# Patient Record
Sex: MALE | Race: OTHER | HISPANIC OR LATINO | URBAN - METROPOLITAN AREA
[De-identification: names, ages, dates, MRNs, and addresses within clinical notes are randomized per-mention and may not be internally consistent; named-entity substitution may affect disease eponyms.]

---

## 2017-08-06 ENCOUNTER — EMERGENCY (EMERGENCY)
Facility: HOSPITAL | Age: 32
LOS: 1 days | Discharge: HOME | End: 2017-08-06
Attending: EMERGENCY MEDICINE | Admitting: EMERGENCY MEDICINE
Payer: SELF-PAY

## 2017-08-06 VITALS
SYSTOLIC BLOOD PRESSURE: 110 MMHG | OXYGEN SATURATION: 99 % | TEMPERATURE: 99 F | DIASTOLIC BLOOD PRESSURE: 66 MMHG | HEART RATE: 61 BPM | WEIGHT: 167.99 LBS | RESPIRATION RATE: 16 BRPM

## 2017-08-06 DIAGNOSIS — Z87.19 PERSONAL HISTORY OF OTHER DISEASES OF THE DIGESTIVE SYSTEM: Chronic | ICD-10-CM

## 2017-08-06 LAB
APPEARANCE UR: CLEAR — SIGNIFICANT CHANGE UP
BACTERIA # UR AUTO: ABNORMAL /HPF
BILIRUB UR-MCNC: NEGATIVE — SIGNIFICANT CHANGE UP
COLOR SPEC: SIGNIFICANT CHANGE UP
DIFF PNL FLD: NEGATIVE — SIGNIFICANT CHANGE UP
GLUCOSE UR QL: NEGATIVE — SIGNIFICANT CHANGE UP
KETONES UR-MCNC: NEGATIVE — SIGNIFICANT CHANGE UP
LEUKOCYTE ESTERASE UR-ACNC: NEGATIVE — SIGNIFICANT CHANGE UP
NITRITE UR-MCNC: NEGATIVE — SIGNIFICANT CHANGE UP
PH UR: 6.5 — SIGNIFICANT CHANGE UP (ref 5–8)
PROT UR-MCNC: NEGATIVE — SIGNIFICANT CHANGE UP
RBC CASTS # UR COMP ASSIST: SIGNIFICANT CHANGE UP /HPF (ref 0–2)
SP GR SPEC: 1.02 — SIGNIFICANT CHANGE UP (ref 1.01–1.02)
UROBILINOGEN FLD QL: NEGATIVE — SIGNIFICANT CHANGE UP
WBC UR QL: SIGNIFICANT CHANGE UP /HPF (ref 0–5)

## 2017-08-06 PROCEDURE — 76870 US EXAM SCROTUM: CPT | Mod: 26

## 2017-08-06 PROCEDURE — 99284 EMERGENCY DEPT VISIT MOD MDM: CPT | Mod: 25

## 2017-08-06 PROCEDURE — 81001 URINALYSIS AUTO W/SCOPE: CPT

## 2017-08-06 PROCEDURE — 76870 US EXAM SCROTUM: CPT

## 2017-08-06 PROCEDURE — 99284 EMERGENCY DEPT VISIT MOD MDM: CPT

## 2017-08-06 RX ORDER — IBUPROFEN 200 MG
600 TABLET ORAL ONCE
Qty: 0 | Refills: 0 | Status: COMPLETED | OUTPATIENT
Start: 2017-08-06 | End: 2017-08-06

## 2017-08-06 RX ADMIN — Medication 600 MILLIGRAM(S): at 13:45

## 2017-08-06 NOTE — ED PROVIDER NOTE - MEDICAL DECISION MAKING DETAILS
pain in perineal region, may be prostatitis vs UTI vs constipation. Will do UA and gc/chlamydia, likely discharge home with stool softeners pain in perineal region, may be prostatitis vs UTI vs constipation. Will do UA and gc/chlamydia, likely discharge home with stool softeners    Attending MD Resendez: 32 male with no PMH and progressive perineal pain over several months.  No painful urination, ejaculation or penile discharge. On exam the testicles are normal, and without TTP, uncircumcised.  Plan: motrin, UA, ultrasound and urology consult.

## 2017-08-06 NOTE — CONSULT NOTE ADULT - SUBJECTIVE AND OBJECTIVE BOX
Consult Note    HPI: 33 y/o M  with many years of perineal pain (described as pain behind the testicles radiating to rectum) that is worsened with movement during the day, occurs in short bursts, appears to be spasms of pain. No obstructive or irritative voiding symptoms, no f/c/n/v, no renal colic, no constipation, no hematuria or dysuria, no hematospermia or pain with ejaculation. Has not tried any meds for this and has not seen anyone for this. Comes in today because these episodes are becoming more frequent.    At the time of evaluation has no pain.     PMH: asthma  PSH: none  FH: no  malignancy  Social: n/c    ROS as above, all else negative    O:  T(F): 98.7 (17 @ 11:02), Max: 98.7 (17 @ 11:02)  HR: 61 (17 @ 11:02) (61 - 61)  BP: 110/66 (17 @ 11:02) (110/66 - 110/66)  RR: 16 (17 @ 11:02) (16 - 16)  SpO2: 99% (17 @ 11:02) (99% - 99%)  Wt(kg): --    PE  NAD  abd benign  no inguinal lymphadenopathy  penis uncircumcised, no phimosis or paraphimosis, meatus normal  testicles benign w/o tenderness  no scrotal cellulitis  no perineal mass, abscess, cellulitis and no trigger point.    Labs:  Urinalysis Basic - ( 06 Aug 2017 12:04 )    Color: x / Appearance: Clear / S.017 / pH: x  Gluc: x / Ketone: Negative  / Bili: Negative / Urobili: Negative   Blood: x / Protein: Negative / Nitrite: Negative   Leuk Esterase: Negative / RBC: 0-2 /HPF / WBC 0-2 /HPF   Sq Epi: x / Non Sq Epi: x / Bacteria: Few /HPF      < from: US Testicles (17 @ 14:36) >  IMPRESSION:     No testicular torsion. No scrotal hematoma.    < end of copied text >

## 2017-08-06 NOTE — ED PROVIDER NOTE - OBJECTIVE STATEMENT
32 M h/o asthma here for testicular pain. Had pain 2 nights ago, then again last night, says between lanight and also last night 32 M h/o asthma here for perineal pain (unlike triage chief complaint, he does NOT have testicular pain). Had pain 2 nights ago, then again last night, states is severe, radiating to the rectum, was not doing anything at time of pain, never happened before. 1 sexual partner is wife, no h/o STI, no penile discharge, no dysuria, no hematuria, no changes in urination. No changes in stooling, no HS, no melena.

## 2017-08-06 NOTE — ED PROVIDER NOTE - PLAN OF CARE
You were seen in the ER for perineal pain. You must follow up with your primary physician in 24 to 48 hours. Return to the ER for any new or worsening signs/symptoms. You must follow up with a urologist in 24 to 48 hours. The number for the urology clinic is [phone].   1) Take ibuprofen, 600 mg,  every 6 to 8 hours for up to 2 weeks for pain control. Afterwards you can take ibuprofen as needed for pain. You were seen in the ER for perineal pain. You must follow up with your primary physician in 24 to 48 hours. Return to the ER for any new or worsening signs/symptoms. You must follow up with a urologist in 24 to 48 hours. The number for the urology clinic is [phone].   1) Take ibuprofen, 600 mg,  every 6 to 8 hours for up to 2 weeks for pain control. Afterwards you can take ibuprofen as needed for pain.  2) Take Tylenol (acetaminophen) 1000 mg every 6 to 8 hours with a daily maximal dose of 3000 mg. Take this regimen for up to 2 weeks, then take the Tylenol only as needed for pain.

## 2017-08-06 NOTE — ED PROVIDER NOTE - NS ED ROS FT
GENERAL: No fever or chills, EYES: no change in vision, HEENT: no trouble swallowing or speaking, CARDIAC: no chest pain, PULMONARY: no cough or SOB, GI: no abdominal pain, no nausea or no vomiting, no diarrhea or constipation, : No changes in urination, SKIN: no rashes, NEURO: no headache,  MSK: No joint pain otherwise as HPI or negative. ~Yaa Carty M.D., Ph.D. -Resident

## 2017-08-06 NOTE — ED ADULT NURSE NOTE - OBJECTIVE STATEMENT
33 yo male A&OX3 presents to the ED with the c/o testicular pain. Pt states that he he has been having intermittent pain from under the testicles to then rectum. Pt states that he has been having symptoms for weeks and has today it lasted 33 yo male A&OX3 presents to the ED with the c/o testicular pain. Pt states that he he has been having intermittent pain from under the testicles to then rectum. Pt states that he has been having symptoms for weeks and has today it lasted 1 min. No c/o urinary incontinence, no burning on urination, no urinary frequency. Pt denies fevers., or chills. Lungs clear, equal b/l no  sob.

## 2017-08-06 NOTE — CONSULT NOTE ADULT - ASSESSMENT
A/P: 32M w/ musculoskeletal-related perineal pain, no acute  pathology    -- tylenol, motrin prn  -- consider skelaxin/flexeril  -- pre-emptive medication when anticipating heavy lifting  -- warm baths, relaxation exercises  -- f/u Dr. Lundy #[phone]

## 2017-08-06 NOTE — ED PROVIDER NOTE - PHYSICAL EXAMINATION
Gen: NAD, AOx3, non-toxic // Head: NCAT // HEENT: EOMI, oral mucosa moist, normal conjunctiva // Lung: CTAB, no respiratory distress, no wheezes/rhonchi/rales B/L, speaking in full sentences. // CV: RRR, no murmurs, rubs or gallops // Abd: soft, NTND, no guarding, no CVA tenderness, rectal exam with smooth prostate, not enlarged, not boggy, nontender. Moderate stool in rectal vault. // MSK: no visible deformities // Neuro: No focal sensory or motor deficits // Skin: Warm, well perfused, no rash // Psych: normal affect. // : Bilateral descended testes, nontender, no inguinal hernia, no color change or swelling. ~Yaa Carty M.D., Ph.D. -Resident

## 2017-08-06 NOTE — ED PROVIDER NOTE - ATTENDING CONTRIBUTION TO CARE
Attending MD Resendez:  I personally have seen and examined this patient.  Resident note reviewed and agree on plan of care and except where noted.  See MDM for details.

## 2017-08-06 NOTE — ED PROVIDER NOTE - CARE PLAN
Principal Discharge DX:	Perineal pain in male  Instructions for follow-up, activity and diet:	You were seen in the ER for perineal pain. You must follow up with your primary physician in 24 to 48 hours. Return to the ER for any new or worsening signs/symptoms. You must follow up with a urologist in 24 to 48 hours. The number for the urology clinic is [phone].   1) Take ibuprofen, 600 mg,  every 6 to 8 hours for up to 2 weeks for pain control. Afterwards you can take ibuprofen as needed for pain. Principal Discharge DX:	Perineal pain in male  Instructions for follow-up, activity and diet:	You were seen in the ER for perineal pain. You must follow up with your primary physician in 24 to 48 hours. Return to the ER for any new or worsening signs/symptoms. You must follow up with a urologist in 24 to 48 hours. The number for the urology clinic is [phone].   1) Take ibuprofen, 600 mg,  every 6 to 8 hours for up to 2 weeks for pain control. Afterwards you can take ibuprofen as needed for pain. Principal Discharge DX:	Perineal pain in male  Instructions for follow-up, activity and diet:	You were seen in the ER for perineal pain. You must follow up with your primary physician in 24 to 48 hours. Return to the ER for any new or worsening signs/symptoms. You must follow up with a urologist in 24 to 48 hours. The number for the urology clinic is [phone].   1) Take ibuprofen, 600 mg,  every 6 to 8 hours for up to 2 weeks for pain control. Afterwards you can take ibuprofen as needed for pain. Principal Discharge DX:	Perineal pain in male  Instructions for follow-up, activity and diet:	You were seen in the ER for perineal pain. You must follow up with your primary physician in 24 to 48 hours. Return to the ER for any new or worsening signs/symptoms. You must follow up with a urologist in 24 to 48 hours. The number for the urology clinic is [phone].   1) Take ibuprofen, 600 mg,  every 6 to 8 hours for up to 2 weeks for pain control. Afterwards you can take ibuprofen as needed for pain.  2) Take Tylenol (acetaminophen) 1000 mg every 6 to 8 hours with a daily maximal dose of 3000 mg. Take this regimen for up to 2 weeks, then take the Tylenol only as needed for pain.

## 2017-08-07 LAB
C TRACH RRNA SPEC QL NAA+PROBE: SIGNIFICANT CHANGE UP
N GONORRHOEA RRNA SPEC QL NAA+PROBE: SIGNIFICANT CHANGE UP
SPECIMEN SOURCE: SIGNIFICANT CHANGE UP

## 2018-05-24 PROBLEM — J45.909 UNSPECIFIED ASTHMA, UNCOMPLICATED: Chronic | Status: ACTIVE | Noted: 2017-08-06

## 2018-06-11 ENCOUNTER — APPOINTMENT (OUTPATIENT)
Dept: INTERNAL MEDICINE | Facility: CLINIC | Age: 33
End: 2018-06-11

## 2018-06-11 PROBLEM — Z00.00 ENCOUNTER FOR PREVENTIVE HEALTH EXAMINATION: Status: ACTIVE | Noted: 2018-06-11

## 2018-06-27 ENCOUNTER — APPOINTMENT (OUTPATIENT)
Dept: INTERNAL MEDICINE | Facility: CLINIC | Age: 33
End: 2018-06-27

## 2018-12-04 ENCOUNTER — EMERGENCY (EMERGENCY)
Facility: HOSPITAL | Age: 33
LOS: 1 days | Discharge: HOME | End: 2018-12-04
Attending: EMERGENCY MEDICINE
Payer: MEDICAID

## 2018-12-04 VITALS
DIASTOLIC BLOOD PRESSURE: 71 MMHG | OXYGEN SATURATION: 99 % | SYSTOLIC BLOOD PRESSURE: 117 MMHG | HEIGHT: 68 IN | RESPIRATION RATE: 18 BRPM | HEART RATE: 68 BPM | WEIGHT: 134.92 LBS

## 2018-12-04 VITALS
RESPIRATION RATE: 16 BRPM | TEMPERATURE: 98 F | DIASTOLIC BLOOD PRESSURE: 75 MMHG | OXYGEN SATURATION: 100 % | HEART RATE: 60 BPM | SYSTOLIC BLOOD PRESSURE: 118 MMHG

## 2018-12-04 DIAGNOSIS — Z87.19 PERSONAL HISTORY OF OTHER DISEASES OF THE DIGESTIVE SYSTEM: Chronic | ICD-10-CM

## 2018-12-04 LAB
ALBUMIN SERPL ELPH-MCNC: 4.7 G/DL — SIGNIFICANT CHANGE UP (ref 3.3–5)
ALP SERPL-CCNC: 105 U/L — SIGNIFICANT CHANGE UP (ref 40–120)
ALT FLD-CCNC: 17 U/L — SIGNIFICANT CHANGE UP (ref 10–45)
AMYLASE P1 CFR SERPL: 107 U/L — SIGNIFICANT CHANGE UP (ref 25–125)
ANION GAP SERPL CALC-SCNC: 14 MMOL/L — SIGNIFICANT CHANGE UP (ref 5–17)
AST SERPL-CCNC: 17 U/L — SIGNIFICANT CHANGE UP (ref 10–40)
BILIRUB SERPL-MCNC: 0.4 MG/DL — SIGNIFICANT CHANGE UP (ref 0.2–1.2)
BUN SERPL-MCNC: 11 MG/DL — SIGNIFICANT CHANGE UP (ref 7–23)
CALCIUM SERPL-MCNC: 9.2 MG/DL — SIGNIFICANT CHANGE UP (ref 8.4–10.5)
CHLORIDE SERPL-SCNC: 102 MMOL/L — SIGNIFICANT CHANGE UP (ref 96–108)
CO2 SERPL-SCNC: 25 MMOL/L — SIGNIFICANT CHANGE UP (ref 22–31)
CREAT SERPL-MCNC: 0.85 MG/DL — SIGNIFICANT CHANGE UP (ref 0.5–1.3)
GLUCOSE SERPL-MCNC: 83 MG/DL — SIGNIFICANT CHANGE UP (ref 70–99)
HCT VFR BLD CALC: 46.3 % — SIGNIFICANT CHANGE UP (ref 39–50)
HGB BLD-MCNC: 16.1 G/DL — SIGNIFICANT CHANGE UP (ref 13–17)
LIDOCAIN IGE QN: 73 U/L — HIGH (ref 7–60)
MCHC RBC-ENTMCNC: 30.1 PG — SIGNIFICANT CHANGE UP (ref 27–34)
MCHC RBC-ENTMCNC: 34.8 GM/DL — SIGNIFICANT CHANGE UP (ref 32–36)
MCV RBC AUTO: 86.5 FL — SIGNIFICANT CHANGE UP (ref 80–100)
OB PNL STL: NEGATIVE — SIGNIFICANT CHANGE UP
PLATELET # BLD AUTO: 259 K/UL — SIGNIFICANT CHANGE UP (ref 150–400)
POTASSIUM SERPL-MCNC: 3.9 MMOL/L — SIGNIFICANT CHANGE UP (ref 3.5–5.3)
POTASSIUM SERPL-SCNC: 3.9 MMOL/L — SIGNIFICANT CHANGE UP (ref 3.5–5.3)
PROT SERPL-MCNC: 7.6 G/DL — SIGNIFICANT CHANGE UP (ref 6–8.3)
RBC # BLD: 5.35 M/UL — SIGNIFICANT CHANGE UP (ref 4.2–5.8)
RBC # FLD: 12.3 % — SIGNIFICANT CHANGE UP (ref 10.3–14.5)
SODIUM SERPL-SCNC: 141 MMOL/L — SIGNIFICANT CHANGE UP (ref 135–145)
WBC # BLD: 5.4 K/UL — SIGNIFICANT CHANGE UP (ref 3.8–10.5)
WBC # FLD AUTO: 5.4 K/UL — SIGNIFICANT CHANGE UP (ref 3.8–10.5)

## 2018-12-04 PROCEDURE — 74177 CT ABD & PELVIS W/CONTRAST: CPT

## 2018-12-04 PROCEDURE — 93005 ELECTROCARDIOGRAM TRACING: CPT

## 2018-12-04 PROCEDURE — 85027 COMPLETE CBC AUTOMATED: CPT

## 2018-12-04 PROCEDURE — 96375 TX/PRO/DX INJ NEW DRUG ADDON: CPT

## 2018-12-04 PROCEDURE — 93010 ELECTROCARDIOGRAM REPORT: CPT

## 2018-12-04 PROCEDURE — 74177 CT ABD & PELVIS W/CONTRAST: CPT | Mod: 26

## 2018-12-04 PROCEDURE — 96374 THER/PROPH/DIAG INJ IV PUSH: CPT | Mod: XU

## 2018-12-04 PROCEDURE — 83690 ASSAY OF LIPASE: CPT

## 2018-12-04 PROCEDURE — 99284 EMERGENCY DEPT VISIT MOD MDM: CPT | Mod: 25

## 2018-12-04 PROCEDURE — 82150 ASSAY OF AMYLASE: CPT

## 2018-12-04 PROCEDURE — 82272 OCCULT BLD FECES 1-3 TESTS: CPT

## 2018-12-04 PROCEDURE — 80053 COMPREHEN METABOLIC PANEL: CPT

## 2018-12-04 RX ORDER — FAMOTIDINE 10 MG/ML
1 INJECTION INTRAVENOUS
Qty: 7 | Refills: 0 | OUTPATIENT
Start: 2018-12-04 | End: 2018-12-10

## 2018-12-04 RX ORDER — FAMOTIDINE 10 MG/ML
20 INJECTION INTRAVENOUS ONCE
Qty: 0 | Refills: 0 | Status: COMPLETED | OUTPATIENT
Start: 2018-12-04 | End: 2018-12-04

## 2018-12-04 RX ORDER — SODIUM CHLORIDE 9 MG/ML
1000 INJECTION INTRAMUSCULAR; INTRAVENOUS; SUBCUTANEOUS ONCE
Qty: 0 | Refills: 0 | Status: COMPLETED | OUTPATIENT
Start: 2018-12-04 | End: 2018-12-04

## 2018-12-04 RX ORDER — ONDANSETRON 8 MG/1
4 TABLET, FILM COATED ORAL ONCE
Qty: 0 | Refills: 0 | Status: COMPLETED | OUTPATIENT
Start: 2018-12-04 | End: 2018-12-04

## 2018-12-04 RX ADMIN — Medication 30 MILLILITER(S): at 20:41

## 2018-12-04 RX ADMIN — ONDANSETRON 4 MILLIGRAM(S): 8 TABLET, FILM COATED ORAL at 20:42

## 2018-12-04 RX ADMIN — SODIUM CHLORIDE 1000 MILLILITER(S): 9 INJECTION INTRAMUSCULAR; INTRAVENOUS; SUBCUTANEOUS at 20:41

## 2018-12-04 RX ADMIN — FAMOTIDINE 20 MILLIGRAM(S): 10 INJECTION INTRAVENOUS at 20:42

## 2018-12-04 NOTE — ED PROVIDER NOTE - NSFOLLOWUPCLINICS_GEN_ALL_ED_FT
[facility]  Gastroenterology  [address] [place] [id number]  Phone: [phone]  Fax:   Follow Up Time: 4-6 Days

## 2018-12-04 NOTE — ED PROVIDER NOTE - NS ED ROS FT
REVIEW OF SYSTEMS:    CONSTITUTIONAL: No weakness, fevers or chills  EYES/ENT: No visual changes;  No vertigo or throat pain   NECK: No pain or stiffness  RESPIRATORY: No cough, wheezing, hemoptysis; No shortness of breath  CARDIOVASCULAR: No chest pain or palpitations  GASTROINTESTINAL: +epigastric pain. No nausea, vomiting, or hematemesis; No diarrhea or constipation. No melena or hematochezia.  GENITOURINARY: No dysuria, frequency or hematuria  NEUROLOGICAL: No numbness or weakness  SKIN: No itching, rashes

## 2018-12-04 NOTE — ED ADULT NURSE NOTE - CHPI ED NUR SYMPTOMS NEG
no burning urination/no blood in stool/no vomiting/no fever/no hematuria/no nausea/no dysuria/no abdominal distension/no diarrhea/no chills

## 2018-12-04 NOTE — ED ADULT TRIAGE NOTE - CHIEF COMPLAINT QUOTE
Patient presents with abdominal pain that started three weeks ago. Pain worse at night. Patient denies nausea, vomiting.

## 2018-12-04 NOTE — ED PROVIDER NOTE - CARE PLAN
Principal Discharge DX:	Epigastric abdominal pain  Assessment and plan of treatment:	You were in the hospital with stomach pain.  The CT scan did not show a clear reason for the pain.  We suggest you follow up with a GI doctor to further find out the reason for the pain.

## 2018-12-04 NOTE — ED PROVIDER NOTE - MEDICAL DECISION MAKING DETAILS
Epigastric abdominal pain, most likely due to GERD.  Will give pepcid, mylanta, zofran cocktail.  Will also send stool guiaic, EKG

## 2018-12-04 NOTE — ED PROVIDER NOTE - ATTENDING CONTRIBUTION TO CARE
Private Physician NONE  33y male pmh Neg, No habits, travel, No dm,htn,hld, Pt comes to ed complains of "heart burn past two months" epigastric, worse at night, No change in weight,fc,cough,sputum. Worse with eating citiric, No nausea and vomiting,diarrhea, gi bleeding. PE WDWN NCAT Chest clear anterior & posterior abd soft +bs no mass guarding,cvat,murphys,psoas. CV no rubs, gallops or murmurs, Neruo no focal defects  Geoffrey Saucedo MD, Facep Private Physician NONE  33y male pmh Neg, No habits, travel, No dm,htn,hld, Pt comes to ed complains of "heart burn past two months" epigastric, worse at night,No pain presently. No change in weight,fc,cough,sputum. Worse with eating citiric, No nausea and vomiting,diarrhea, gi bleeding. PE WDWN NCAT Chest clear anterior & posterior abd soft +bs no mass guarding,cvat,murphys,psoas. CV no rubs, gallops or murmurs, Neruo no focal defects  Geoffrey Saucedo MD, Facep

## 2018-12-04 NOTE — ED PROVIDER NOTE - PROGRESS NOTE DETAILS
Still having burning epigastric pain, has not improved with the maalox/pepcid.  CT scan was negative. Basic labs unremarkable. Patient willing to go home with follow up with GI.

## 2018-12-04 NOTE — ED ADULT NURSE NOTE - NSIMPLEMENTINTERV_GEN_ALL_ED
Implemented All Universal Safety Interventions:  [place] to call system. Call bell, personal items and telephone within reach. Instruct patient to call for assistance. Room bathroom lighting operational. Non-slip footwear when patient is off stretcher. Physically safe environment: no spills, clutter or unnecessary equipment. Stretcher in lowest position, wheels locked, appropriate side rails in place.

## 2018-12-04 NOTE — ED PROVIDER NOTE - PLAN OF CARE
You were in the hospital with stomach pain.  The CT scan did not show a clear reason for the pain.  We suggest you follow up with a GI doctor to further find out the reason for the pain.

## 2018-12-04 NOTE — ED ADULT NURSE NOTE - OBJECTIVE STATEMENT
pt is a 33yr M, no medical hx, presenting with 2 months of intermittent epigastric burning radiating up to chest, worsening over the last few days. denies n/v/fever/chills/cp/diarrhea/constipation. pt reports taking a "white, minty liquid" that helped the pain but then caused diarrhea, diarrhea resolved with stopping of medications. abd soft, +tender in epigastric region. no distress.

## 2018-12-04 NOTE — ED PROVIDER NOTE - PHYSICAL EXAMINATION
PHYSICAL EXAM:  GENERAL: NAD, well-developed  HEAD:  Atraumatic, Normocephalic  EYES: EOMI, PERRLA, conjunctiva and sclera clear  NECK: Supple, No JVD  CHEST/LUNG: Clear to auscultation bilaterally; No wheeze  HEART: Regular rate and rhythm; No murmurs, rubs, or gallops  ABDOMEN: Soft, tender in epigastric region, nondistended  EXTREMITIES:  2+ Peripheral Pulses, No clubbing, cyanosis, or edema  PSYCH: AAOx3  NEUROLOGY: non-focal  SKIN: No rashes or lesions

## 2021-03-30 NOTE — ED ADULT NURSE NOTE - NS ED NURSE RECORD ANOTHER VITAL SIGN
Patients wife calls stating  would like to try the exciteOSA and would like a prescription for it. Patient wife did call insurance and it is covered at 80% but does require a PA.  Okay for prescription? Please advise.    No

## 2022-11-24 NOTE — ED PROVIDER NOTE - OBJECTIVE STATEMENT
32yo with no significant PMH presents with epigastric abdominal pain.  He has had a burning in his middle chest for the last two months.  It is not associated with eating.  He has not had any melena or blood in the stool.  He took what he thinks is maalox for the heartburn but stopped it because it gave him diarrhea.  He has no nausea or vomiting.
Pt with intractable pain and nausea, admitted for colitis and IV meds

## 2023-04-13 NOTE — ED ADULT NURSE NOTE - NS ED PATIENT SAFETY CONCERN
Notes, labs, vitals reviewed.  Pt may be discharged to [facility] today.  Will call [facility] Nephrologist who is rounding there c a signout.  HD orders written in case he isn't d/c'd.   No

## 2025-01-25 ENCOUNTER — EMERGENCY (EMERGENCY)
Facility: HOSPITAL | Age: 40
LOS: 1 days | Discharge: HOME | End: 2025-01-25
Attending: EMERGENCY MEDICINE
Payer: MEDICAID

## 2025-01-25 VITALS
HEIGHT: 68 IN | TEMPERATURE: 98 F | DIASTOLIC BLOOD PRESSURE: 63 MMHG | RESPIRATION RATE: 16 BRPM | SYSTOLIC BLOOD PRESSURE: 106 MMHG | OXYGEN SATURATION: 99 % | HEART RATE: 70 BPM | WEIGHT: 169.98 LBS

## 2025-01-25 VITALS
OXYGEN SATURATION: 98 % | HEART RATE: 77 BPM | RESPIRATION RATE: 20 BRPM | TEMPERATURE: 98 F | SYSTOLIC BLOOD PRESSURE: 110 MMHG | DIASTOLIC BLOOD PRESSURE: 74 MMHG

## 2025-01-25 DIAGNOSIS — Z87.19 PERSONAL HISTORY OF OTHER DISEASES OF THE DIGESTIVE SYSTEM: Chronic | ICD-10-CM

## 2025-01-25 PROCEDURE — 99284 EMERGENCY DEPT VISIT MOD MDM: CPT

## 2025-01-25 RX ORDER — METHOCARBAMOL 500 MG
3 TABLET ORAL
Qty: 27 | Refills: 0
Start: 2025-01-25 | End: 2025-01-27

## 2025-01-25 RX ORDER — METHOCARBAMOL 500 MG
1500 TABLET ORAL ONCE
Refills: 0 | Status: COMPLETED | OUTPATIENT
Start: 2025-01-25 | End: 2025-01-25

## 2025-01-25 RX ORDER — LIDOCAINE 50 MG/G
1 OINTMENT TOPICAL ONCE
Refills: 0 | Status: COMPLETED | OUTPATIENT
Start: 2025-01-25 | End: 2025-01-25

## 2025-01-25 RX ORDER — ACETAMINOPHEN 80 MG/.8ML
975 SOLUTION/ DROPS ORAL ONCE
Refills: 0 | Status: COMPLETED | OUTPATIENT
Start: 2025-01-25 | End: 2025-01-25

## 2025-01-25 RX ADMIN — ACETAMINOPHEN 975 MILLIGRAM(S): 80 SOLUTION/ DROPS ORAL at 18:09

## 2025-01-25 RX ADMIN — LIDOCAINE 1 PATCH: 50 OINTMENT TOPICAL at 18:08

## 2025-01-25 RX ADMIN — Medication 1500 MILLIGRAM(S): at 18:09

## 2025-01-25 NOTE — ED PROVIDER NOTE - PHYSICAL EXAMINATION
GENERAL: no acute distress, non-toxic appearing  CARDIAC: regular rate and regular rhythm  PULM: clear to ascultation bilaterally, no appreciable crackles, rales, rhonchi, or wheezing, no increased work of breathing  GI: abdomen nondistended, soft, nontender  : no CVA tenderness, no suprapubic tenderness  NEURO: no gross motor or sensory deficits in b/l LE  MSK: no visible deformities, no peripheral edema, calf tenderness/redness/swelling, positive straight leg test on the right.

## 2025-01-25 NOTE — ED PROVIDER NOTE - PATIENT PORTAL LINK FT
You can access the FollowMyHealth Patient Portal offered by [facility] by registering at the following website: [email or web address]. By joining [facility]’s FollowMyHealth portal, you will also be able to view your health information using other applications (apps) compatible with our system.

## 2025-01-25 NOTE — ED ADULT NURSE NOTE - OBJECTIVE STATEMENT
39y Male denies PMH, present to ED to lower leg from the R heel radiating to the lower back for 2 months. Pt states he went to an OSH was prescribed a medication, pt doesn't recall the name of medication, but was ineffective. Pt able to ambulate, states the pain is constant, no edema noted, no physical deformity noted, able to move all extremities, pulses present, denies shortness of breath, n/v, chest pain, abdominal pain, numbness or tingling

## 2025-01-25 NOTE — ED PROVIDER NOTE - NSFOLLOWUPINSTRUCTIONS_ED_ALL_ED_FT
Thank you for coming to the Emergency Department.    You were seen today for back pain. Based on our examination we have determined that there is no immediate danger to your health at this time.    We would like you to follow up with your primary care doctor within 1 week.     our discharge center will call you and assist you with making the appt or you can call: Find a Physician helpline ([phone]) for assistance     We recommend you take 600mg ibuprofen every 6 hours or acetaminophen 650mg every 6 hours as needed for pain. If needed, you can alternate these medications so that you take one medication every 3 hours. For instance, at noon take ibuprofen, then at 3pm take acetaminophen, then at 6pm take ibuprofen.    Please do not exceed 4,000 mg of acetaminophen during a 24 hours period.  Acetaminophen can be found in many over-the-counter cold medications as well as  opioid medications that may be given for pain.     We sent the follow medication to your pharmacy:  1500 mg Robaxin three times a day for three days     PLEASE RETURN TO THE EMERGENCY DEPARTMENT and call 911 IF YOU EXPERIENCE ANY OF THE FOLLOWING SYMPTOMS:

## 2025-01-25 NOTE — ED PROVIDER NOTE - OBJECTIVE STATEMENT
40 y/o M with no significant PMHx presenting with lower back pain radiating down his right leg with associated parasthesias 38 y/o M with no significant PMHx presenting with lower back pain radiating down his right leg with associated paresthesias for the past two months. Pt states he has been to several emergency departments for care as pt states his insurance will not cover outpatient treatment.  Pt denies fever, chills, abdominal pain, nausea, vomiting, dysuria, saddle anesthesia, bowel or bladder incontinence, hx of IV drug use, hx of back surgeries/epidural injections, recent falls/trauma.

## 2025-01-25 NOTE — ED PROVIDER NOTE - CLINICAL SUMMARY MEDICAL DECISION MAKING FREE TEXT BOX
Attending note.  Mir -patient was seen in room #31 to the left.   used for history and physical examination.  Patient reports atraumatic lower back pain with radiation to the right leg for the last 2 months which is now constant.  Patient states she is having insurance issues and is only been seen in the emergency department.  Denies any bowel bladder dysfunction, saddle anesthesia.  Reports occasional paresthesia in the right leg.  He has been taking meloxicam without relief.  He denies any abdominal pain or fever.  Pain is worse with movement.     ROS-as above, otherwise negative.  PE-patient is alert in moderate distress.  Examination of the back reveals no rashes or lesions.  Patient has right and left paralumbar tenderness.  There is no midline tenderness.  Patient reports pain in the hamstrings bilaterally with straight leg raise.  Sensation is intact and normal.  There is no foot drop.  There is no clonus.  DTRs are +2/4 in the left patellar and Achilles.  DTRs are +2/4 in the right patellar, but 1/4 in the right Achilles.     A/P-chronic lower back pain.  Patient is currently taking meloxicam.  Tylenol, muscle relaxant, like an patch, referral to spine center for further evaluation and management.  No red flags and no imaging required at this time.

## 2025-01-25 NOTE — ED PROVIDER NOTE - PROGRESS NOTE DETAILS
Pt reassessed. Pt endorsing improvement in pain. Will send robaxcin to pt's pharmacy. Will give referral for spinal institute. Pt is afebrile currently, vitals are stable. Pt was educated on outpatient treatment, follow up and return precautions. Shared decision making performed. Pt okay for DC home at this time. Questions answered. Pt understands and agrees with the plan for discharge home. Pt has access to appropriate follow up.   Nora Giordano PGY1

## 2025-01-25 NOTE — ED ADULT NURSE NOTE - NURSING MUSC JOINTS
FYI - will always fit my babies and children in and need to keep [facility] on schedule . I perfer seeing baby tomorrow or Thursday afternoon. IF can not make it in afternoon - fit child into one of the mornings.    Thanks    no pain, swelling or deformity of joints

## 2025-01-25 NOTE — ED ADULT TRIAGE NOTE - RESPIRATORY RATE (BREATHS/MIN)
16

## 2025-01-25 NOTE — ED ADULT NURSE NOTE - AS PAIN REST
Please walk in safe environments without treacherous ground and walk with a family member if you have balance or CV concerns.  Walk when the weather permits at the cool hours of the day in the summer and during rainy seasons.  PT recommends a HR monitor or pedometer to track levels of fitness daily and weekly.  Please do not exceed heart rate max when walking.  See AHA for Heart rate range recommendations for someone your age.     
5 (moderate pain)